# Patient Record
Sex: MALE | Race: WHITE
[De-identification: names, ages, dates, MRNs, and addresses within clinical notes are randomized per-mention and may not be internally consistent; named-entity substitution may affect disease eponyms.]

---

## 2018-07-04 ENCOUNTER — HOSPITAL ENCOUNTER (EMERGENCY)
Dept: HOSPITAL 68 - ERH | Age: 68
End: 2018-07-04
Payer: COMMERCIAL

## 2018-07-04 VITALS — BODY MASS INDEX: 32.96 KG/M2 | HEIGHT: 67 IN | WEIGHT: 210 LBS

## 2018-07-04 VITALS — DIASTOLIC BLOOD PRESSURE: 90 MMHG | SYSTOLIC BLOOD PRESSURE: 164 MMHG

## 2018-07-04 DIAGNOSIS — M54.42: Primary | ICD-10-CM

## 2018-07-04 NOTE — RADIOLOGY REPORT
EXAMINATION:
XR LUMBOSACRAL SPINE
 
CLINICAL INFORMATION:
Low-back pain.
 
COMPARISON:
None.
 
TECHNIQUE:
4 views of the lumbosacral spine were obtained.
 
FINDINGS:
Mild rightward curvature of the thoracolumbar junction. On the lateral
projection, there is mild retrolisthesis of L3 on L4. Moderate disc
degenerative changes at L3-L4. Mild disc degenerative changes at L4-L5,
L2-L3. Mild endplate degenerative changes otherwise. Vertebral body heights
are maintained. No evidence of acute fracture. Multilevel facet degeneration.
Left lateral endplate spurring at L1-L2. SI joints intact. There are surgical
clips projected over the symphysis. SI joints are intact.
 
IMPRESSION:
1. No radiographic evidence of acute fracture.
 
2. Multilevel disc degenerative changes in the lumbar spine, as detailed
above.

## 2018-07-04 NOTE — ED NECK/BACK PAIN COMPLAINT
History of Present Illness
 
General
Chief Complaint: General Adult
Stated Complaint: "I NEED PAIN MEDS"
Source: patient
Exam Limitations: no limitations
 
Vital Signs & Intake/Output
Vital Signs & Intake/Output
 Vital Signs
 
 
Date Time Temp Pulse Resp B/P B/P Pulse O2 O2 Flow FiO2
 
     Mean Ox Delivery Rate 
 
 1613 97.6 65 18 164/90  99 Room Air  
 
 
 
Allergies
Coded Allergies:
No Known Allergies (18)
 
Reconcile Medications
Cyclobenzaprine HCl 10 MG TABLET   1 TAB PO TID spasms
Methylprednisolone. (Medrol) 4 MG TAB.DS.PK   1 DP PO AD scaitica
     6 on day 1 then reduce by one tablet daily until gone
Oxycodone HCl/Acetaminophen (Percocet 5-325 MG Tablet) 5 MG-325 MG TABLET   1-2 
TAB PO BID pain
 
Triage Note:
PT STATES HE IS HAVING PAIN IN HIS SCIATIC.  PT
 STATES HE HAS HAD THIS BEFORE ABOUT 20 MINUTES
 AGO.  PT STATES THE PAIN BEGAN 2 DAYS AGO BUT
 TODAY IT WAS WORSE.  PT STATES HE TOOK ONE MOTRIN
 TODAY WITH NO RELIEF
Triage Nurses Notes Reviewed? yes
Onset: Abrupt
Duration: day(s):
Timing: recent history
Quality/Severity: moderate
Loss of Consciousness: no loss of consciousness
HPI:
68-year-old male comes into the emergency room for further evaluation of left 
lower back pain wrapping down his left leg.  Pain is sharp.  Shooting.  Worse 
with movement.  He's had this before but not this severe.  Nothing seems to make
the symptoms better.  No relief with ibuprofen.
(Justus Craig)
 
Past History
 
Travel History
Traveled to [place] past 21 day No
 
Medical History
Any Pertinent Medical History? see below for history
Cardiovascular: hypertension
 
Surgical History
Surgical History: non-contributory
 
Psychosocial History
What is your primary language English
Tobacco Use: Never used
ETOH Use: denies use
Illicit Drug Use: denies illicit drug use
 
Family History
Hx Contributory? No
(Justus Craig)
 
Review of Systems
 
Review of Systems
Constitutional:
Reports: no symptoms. 
Eyes:
Reports: no symptoms. 
Ears, Nose, Throat, Mouth:
Reports: no symptoms. 
Respiratory:
Reports: no symptoms. 
Cardiovascular:
Reports: no symptoms. 
Gastrointestinal/Abdominal:
Reports: no symptoms. 
Musculoskeletal:
Reports: see HPI. 
Skin:
Reports: no symptoms. 
Neurological/Psychological:
Reports: no symptoms. 
All Other Systems: Reviewed and Negative
(Justus Craig)
 
Physical Exam
 
Physical Exam
General Appearance: well developed/nourished, mild distress
Head: atraumatic
Eyes:
Bilateral: normal appearance. 
Ears, Nose, Throat, Mouth: hearing grossly normal
Neck: normal inspection
Respiratory: normal breath sounds, no respiratory distress
Back: normal inspection, TEDNERNESS LEFT LOWER BACK
Extremities: normal range of motion
Motor:
   Deficit L4 Right: No
   Deficit L4 Left: No
   Deficit L5 Right: No
   Deficit L5 Left: No
   Deficit S1 Right: No
   Deficit S1 Right: No
Neurologic/Psych: awake, alert, oriented x 3, normal mood/affect
Skin: intact, normal color, warm/dry
 
Core Measures
CVA/TIA Diagnosis: No
([address],Justus)
 
Progress
Differential Diagnosis: cauda equina syn, herniated disc, myofascial strain, 
pyelo/UTI, sciatica, spinal cord inj, thoracic outlet syn
Plan of Care:
 Orders
 
 
Procedure Date/time Status
 
XRY-LUMBOSACRAL SPINE 4 VIEWS 1613 Active
 
 
 
Diagnostic Imaging:
Viewed by Me: Radiology Read.  Discussed w/RAD: Radiology Read. 
Radiology Impression: PATIENT: DONAVON SPICER JR  MEDICAL RECORD NO: [id number] 
PRESENT AGE: 68  PATIENT ACCOUNT NO: [id number] : 50  LOCATION: [facility] 
ORDERING PHYSICIAN: Justus STREET     SERVICE DATE:  EXAM TYPE
: RAD - XRY-LUMBOSACRAL SPINE 4 VIEWS EXAMINATION: XR LUMBOSACRAL SPINE CLINICAL
INFORMATION: Low-back pain. COMPARISON: None. TECHNIQUE: 4 views of the 
lumbosacral spine were obtained. FINDINGS: Mild rightward curvature of the 
thoracolumbar junction. On the lateral projection, there is mild retrolisthesis 
of L3 on L4. Moderate disc degenerative changes at L3-L4. Mild disc degenerative
changes at L4-L5, L2-L3. Mild endplate degenerative changes otherwise. Vertebral
body heights are maintained. No evidence of acute fracture. Multilevel facet 
degeneration. Left lateral endplate spurring at L1-L2. SI joints intact. There 
are surgical clips projected over the symphysis. SI joints are intact. 
IMPRESSION: 1. No radiographic evidence of acute fracture. 2. Multilevel disc 
degenerative changes in the lumbar spine, as detailed above. DICTATED BY: Ricky Garza MD  DATE/TIME DICTATED:18 :SARAH  DATE/
TIME TRANSCRIBED:18 / 1704 CONFIDENTIAL, DO NOT COPY WITHOUT APPROPRIATE 
AUTHORIZATION.  <Electronically signed in Other Vendor System>                  
                                                                     SIGNED BY: 
Ricky Garza MD [id number]
(Justus Craig)
 
Departure
 
Departure
Disposition: HOME OR SELF CARE
Condition: Stable
Clinical Impression
Primary Impression: Sciatica of left side
Referrals:
Unknown (PCP/Family)
 
Additional Instructions:
Take Percocet Flexeril and Medrol Dosepak as prescribed.  Follow-up with primary
care doctor.  Return if any concerns worsening symptoms.
 
Please go over all results of today's visit with your primary care doctor.  
Contact your primary care doctor to let them know you were here in the emergency
room.  There may be nonspecific findings which may not be related to your visit 
today here in the emergency room but may require further evaluation and chronic 
monitoring by your primary care doctor.  If you had a laceration today the 
chance of foreign body always remains. You should follow-up with your primary 
care doctor for recheck in 3-5 days for a wound check.  If you had an x-ray done
there is a chance that a fracture could have been missed on initial read and you
should follow-up with your primary care doctor for repeat x-rays if symptoms 
persist.  If your blood pressure was elevated here in the emergency room please 
have rechecked by [facility] primary care doctor within the next 48.  If you were 
prescribed a narcotic here in the emergency room or any type of controlled 
substances you're not allowed to drive while taking this medication or operate 
any type of heavy machinery.  Narcotics can make you feel lightheaded dizziness 
nausea and can cause constipation.  You may need to  a stool softener.  
Thank you for choosing [facility] emergency room.  Please return to the 
emergency room immediately if you have any other concerns worsening of symptoms.
 
Departure Forms:
Customer Survey
General Discharge Information
Prescriptions:
Current Visit Scripts
Oxycodone HCl/Acetaminophen (Percocet 5-325 MG Tablet) 1-2 TAB PO BID  
     #10 TAB 
 
Cyclobenzaprine HCl 1 TAB PO TID  
     #30 TAB 
 
Methylprednisolone. (Medrol) 1 DP PO AD  
     #1 DP 
     6 on day 1 then reduce by one tablet daily until gone
 
 
Comments
2018 5:54:02 PM
 
Patient clinically looks well.  Patient is no apparent distress.  Nontoxic-
appearing.  No motor deficits.  Symptoms are most consistent with sciatica and 
lumbar radiculopathy.  Patient's pain improved after Percocet.  Follow-up with 
PCP.  Return if any other concerns.
([address],Justus)
 
PA/NP Co-Sign Statement
Statement:
ED Attending supervision documentation-
 
[] I saw and evaluated the patient. I have also reviewed all the pertinent lab 
results and diagnostic results. I agree with the findings and the plan of care 
as documented in the PA's/NP's documentation. 
 
[X] I have reviewed the ED Record and agree with the PA's/NP's documentation.
 
[] Additions or exceptions (if any) to the PAs/NP's note and plan are 
summarized below:
[]
 
(Edgard WILLIAM,Mehrdad SANTIZO)